# Patient Record
Sex: MALE | Race: BLACK OR AFRICAN AMERICAN | NOT HISPANIC OR LATINO | Employment: UNEMPLOYED | URBAN - METROPOLITAN AREA
[De-identification: names, ages, dates, MRNs, and addresses within clinical notes are randomized per-mention and may not be internally consistent; named-entity substitution may affect disease eponyms.]

---

## 2024-04-08 ENCOUNTER — APPOINTMENT (OUTPATIENT)
Dept: PRIMARY CARE | Facility: CLINIC | Age: 44
End: 2024-04-08
Payer: COMMERCIAL

## 2024-05-13 ENCOUNTER — APPOINTMENT (OUTPATIENT)
Dept: PRIMARY CARE | Facility: CLINIC | Age: 44
End: 2024-05-13
Payer: COMMERCIAL

## 2024-09-13 DIAGNOSIS — I47.10 SUPRAVENTRICULAR TACHYCARDIA (CMS-HCC): ICD-10-CM

## 2024-09-22 RX ORDER — METOPROLOL TARTRATE 25 MG/1
TABLET, FILM COATED ORAL
Qty: 180 TABLET | Refills: 3 | Status: SHIPPED | OUTPATIENT
Start: 2024-09-22 | End: 2024-09-27 | Stop reason: ALTCHOICE

## 2024-09-27 ENCOUNTER — ANCILLARY PROCEDURE (OUTPATIENT)
Dept: CARDIOLOGY | Facility: CLINIC | Age: 44
End: 2024-09-27
Payer: COMMERCIAL

## 2024-09-27 ENCOUNTER — OFFICE VISIT (OUTPATIENT)
Dept: CARDIOLOGY | Facility: CLINIC | Age: 44
End: 2024-09-27
Payer: COMMERCIAL

## 2024-09-27 VITALS
HEART RATE: 68 BPM | BODY MASS INDEX: 22.73 KG/M2 | RESPIRATION RATE: 20 BRPM | DIASTOLIC BLOOD PRESSURE: 99 MMHG | SYSTOLIC BLOOD PRESSURE: 167 MMHG | WEIGHT: 150 LBS | HEIGHT: 68 IN | OXYGEN SATURATION: 97 %

## 2024-09-27 DIAGNOSIS — I10 HYPERTENSION, UNSPECIFIED TYPE: Primary | ICD-10-CM

## 2024-09-27 DIAGNOSIS — F17.200 NICOTINE USE DISORDER: ICD-10-CM

## 2024-09-27 DIAGNOSIS — I51.7 LEFT VENTRICULAR HYPERTROPHY BY ELECTROCARDIOGRAM: ICD-10-CM

## 2024-09-27 DIAGNOSIS — I47.10 SVT (SUPRAVENTRICULAR TACHYCARDIA) (CMS-HCC): ICD-10-CM

## 2024-09-27 DIAGNOSIS — I10 ESSENTIAL HYPERTENSION: ICD-10-CM

## 2024-09-27 PROBLEM — R03.0 ELEVATED BLOOD PRESSURE READING WITHOUT DIAGNOSIS OF HYPERTENSION: Status: ACTIVE | Noted: 2024-09-27

## 2024-09-27 LAB
ATRIAL RATE: 68 BPM
BODY SURFACE AREA: 1.81 M2
P AXIS: 63 DEGREES
P OFFSET: 174 MS
P ONSET: 123 MS
PR INTERVAL: 200 MS
Q ONSET: 223 MS
QRS COUNT: 11 BEATS
QRS DURATION: 82 MS
QT INTERVAL: 396 MS
QTC CALCULATION(BAZETT): 421 MS
QTC FREDERICIA: 413 MS
R AXIS: 54 DEGREES
T AXIS: 38 DEGREES
T OFFSET: 421 MS
VENTRICULAR RATE: 68 BPM

## 2024-09-27 PROCEDURE — 99214 OFFICE O/P EST MOD 30 MIN: CPT | Performed by: INTERNAL MEDICINE

## 2024-09-27 PROCEDURE — 3077F SYST BP >= 140 MM HG: CPT | Performed by: INTERNAL MEDICINE

## 2024-09-27 PROCEDURE — 99214 OFFICE O/P EST MOD 30 MIN: CPT | Mod: 25 | Performed by: INTERNAL MEDICINE

## 2024-09-27 PROCEDURE — 93010 ELECTROCARDIOGRAM REPORT: CPT | Performed by: INTERNAL MEDICINE

## 2024-09-27 PROCEDURE — 93005 ELECTROCARDIOGRAM TRACING: CPT

## 2024-09-27 PROCEDURE — 3080F DIAST BP >= 90 MM HG: CPT | Performed by: INTERNAL MEDICINE

## 2024-09-27 PROCEDURE — 93246 EXT ECG>7D<15D RECORDING: CPT

## 2024-09-27 PROCEDURE — G2211 COMPLEX E/M VISIT ADD ON: HCPCS | Performed by: INTERNAL MEDICINE

## 2024-09-27 PROCEDURE — 3008F BODY MASS INDEX DOCD: CPT | Performed by: INTERNAL MEDICINE

## 2024-09-27 RX ORDER — METOPROLOL SUCCINATE 50 MG/1
50 TABLET, EXTENDED RELEASE ORAL DAILY
Qty: 30 TABLET | Refills: 11 | Status: SHIPPED | OUTPATIENT
Start: 2024-09-27 | End: 2025-09-27

## 2024-09-27 RX ORDER — DILTIAZEM HYDROCHLORIDE 120 MG/1
120 CAPSULE, EXTENDED RELEASE ORAL EVERY MORNING
COMMUNITY
End: 2024-09-27 | Stop reason: ALTCHOICE

## 2024-09-27 RX ORDER — DILTIAZEM HYDROCHLORIDE 180 MG/1
180 CAPSULE, COATED, EXTENDED RELEASE ORAL DAILY
Qty: 90 CAPSULE | Refills: 3 | Status: SHIPPED | OUTPATIENT
Start: 2024-09-27 | End: 2025-09-27

## 2024-09-27 RX ORDER — OLMESARTAN MEDOXOMIL 20 MG/1
10 TABLET ORAL DAILY
Qty: 15 TABLET | Refills: 11 | Status: SHIPPED | OUTPATIENT
Start: 2024-09-27 | End: 2025-09-27

## 2024-09-27 ASSESSMENT — PATIENT HEALTH QUESTIONNAIRE - PHQ9
2. FEELING DOWN, DEPRESSED OR HOPELESS: NOT AT ALL
1. LITTLE INTEREST OR PLEASURE IN DOING THINGS: NOT AT ALL
SUM OF ALL RESPONSES TO PHQ9 QUESTIONS 1 & 2: 0

## 2024-09-27 ASSESSMENT — PAIN SCALES - GENERAL: PAINLEVEL: 0-NO PAIN

## 2024-09-27 NOTE — PROGRESS NOTES
Referred by Dr. Hdz ref. provider found for No chief complaint on file.     HPI:    Edison Lema is a 44 y.o. 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     States that he has been in his usual state of health. Roughly 2017, he had high heart rates associated with heart palpitations and elevated blood pressure. He was referred on to electrophysiology who determined in the electrophysiologic lab that he had typical AVNRT and was ablated. He had done well with his symptoms until recently when he began to drink increased sports drinks while at work. He began to notice increased heart palpitations. States that he had an event where his blood pressure, heart rate skyrocketed following this and was taken to the hospital. He was evaluated in [facility]. He was given metoprolol tartrate to help control his and was referred on to cardiology.     States since his hospitalization, he has been using Toprol tartrate once daily. He notes that this has been helpful, but he still has significant heart palpitations and concerned about his heart.     Today /0/20/2022) reports that he is doing well. He is making some dietary changes to help improve his overall diet. He feels that this is useful. He is attempting to cut down on his smoking. He is down to roughly 1 cigarette at work, and then he will have 2 or 3 once he gets home. However, he also admits that if he has a beverage while at home, he will begin to smoke even more. He notes that his heart palpitations are dramatically improved. He continues to use the diltiazem daily, he is taking metoprolol most mornings, and occasional evenings. This is reasonable for him.     Today ( 7/31/2023) he returns for follow up. He  has bent trying to quit smoking, down to about 10 cigarettes daily. He has reduced his caffeine     9/27/2024 -- He returns for follow up, having last been seen about 14 months ago. He repors that he continues to have heart palpitations.  He is using the the as needed metoprolol tartrate daily because of his symptomatology.  He is also surprised to see his blood pressure is elevated.  He thinks that this may be related to the fact that he smoked 1 cigarette immediately prior to this appointment.  He does not routinely check his blood pressure at home.     Patient denies chest pain and angina. Pt denies shortness of breath, dyspnea on exertion, orthopnea, and paroxysmal nocturnal dyspnea. Pt denies worsening lower extremity edema. Pt reports palpitations but no  Syncope. No recent falls. No fever or chills. No cough. No change in bowel or bladder habits. No travel. No sick contacts. No recent travel     12 point review of systems was performed and is otherwise negative.  Past medical history: As above.  Medications: Reviewed.    Allergies : No Known Allergies  Family Hx : No family history on file.  Social History     Tobacco Use    Smoking status: Every Day     Current packs/day: 1.00     Types: Cigarettes    Smokeless tobacco: Never   Substance Use Topics    Alcohol use: Yes     Alcohol/week: 7.0 standard drinks of alcohol     Types: 5 Cans of beer, 2 Shots of liquor per week    Drug use: Yes     Frequency: 7.0 times per week     Types: Marijuana             Past medical history reviewed:   has no past medical history on file.    Past surgical history reviewed:   has no past surgical history on file.    Social history reviewed:   has no history on file for tobacco use, alcohol use, and drug use.     Family history reviewed:  No family history on file.    Allergies reviewed: Patient has no allergy information on record.     Medications reviewed:   Current Outpatient Medications   Medication Instructions     metoprolol tartrate (Lopressor) 25 mg tablet TAKE 1 TO 2 TABLETS BY MOUTH EVERY DAY AS NEEDED FOR AN EPISODE OF PALPITATIONS        Vitals reviewed: There were no vitals taken for this visit.    Physical Exam:   There were no vitals taken for this visit.  General:  Patient is awake, alert, and oriented.  Patient is in no acute distress.  HEENT:  Pupils equal and reactive.  Normocephalic.  Moist mucosa.    Neck:  No thyromegaly.  Normal Jugular Venous Pressure.  Cardiovascular:  Regular rate and rhythm.  Normal S1 and S2.  1/6 SILVIA.  Pulmonary:  Clear to auscultation bilaterally.  Abdomen:  Soft. Non-tender.   Non-distended.  Positive bowel sounds.  Lower Extremities:  2+ pedal pulses. No LE edema.  Neurologic:  Cranial nerves intact.  No focal deficit.   Skin: Skin warm and dry, normal skin turgor.   Psychiatric: Normal affect.    Last Labs:  CBC -      Lab Results   Component Value Date    WBC 6.5 09/16/2022    HGB 13.7 09/16/2022    HCT 41.9 09/16/2022     09/16/2022        CMP-  Lab Results   Component Value Date    GLUCOSE 117 (H) 09/16/2022     09/16/2022    K 5.1 09/16/2022     09/16/2022    CO2 30 09/16/2022    ANIONGAP 15 09/16/2022    BUN 11 09/16/2022    CREATININE 0.72 09/16/2022    CALCIUM 10.3 09/16/2022    PHOS 3.9 09/16/2022    PROT 7.6 06/25/2022    ALBUMIN 4.9 09/16/2022    AST 23 06/25/2022    ALT 21 06/25/2022    ALKPHOS 66 06/25/2022    BILITOT 0.6 06/25/2022        LIPIDS-  Lab Results   Component Value Date    CHOL 273 (H) 09/16/2022    TRIG 81 09/16/2022    HDL 77.2 09/16/2022    CHHDL 3.5 09/16/2022        OTHERS-  Lab Results   Component Value Date    BNP 33 09/16/2022        I personally reviewed the patient's recent vitals, labs, medications, orders, EKGs, pertinent cardiac imaging/ echocardiography and ischemic evaluations including stress testing/ cardiac catheterization.    Assessment and Plan:    Problem List Items Addressed This Visit       Essential hypertension     Left ventricular hypertrophy by electrocardiogram    Overview     LVEH by EKG in the setting of poorly controlled blood Pressure          Current Assessment & Plan     We will obtain a transthoracic echocardiogram for structural evaluation including ejection fraction, assessment of regional wall motion abnormalities or valvular disease, and further evaluation of hemodynamics.  -- Begin Olmesartan 10 mg ( 1/2 Tablet of 20 mg ) Daily   -- Check Labs for drug monitoring          Relevant Medications    metoprolol succinate XL (Toprol-XL) 50 mg 24 hr tablet    olmesartan (Benicar) 20 mg tablet    Other Relevant Orders    CBC and Auto Differential    Comprehensive metabolic panel    Transthoracic echo (TTE) complete    Nicotine use disorder    Overview     9/2024 -- Smoking 1 PPD          Relevant Orders    CBC and Auto Differential    Comprehensive metabolic panel    SVT (supraventricular tachycardia) (CMS-HCC)    Relevant Medications    dilTIAZem CD (Cardizem CD) 180 mg 24 hr capsule    metoprolol succinate XL (Toprol-XL) 50 mg 24 hr tablet    Other Relevant Orders    Holter Or Event Cardiac Monitor    CBC and Auto Differential    Comprehensive metabolic panel    Transthoracic echo (TTE) complete     Other Visit Diagnoses       Hypertension, unspecified type    -  Primary    Relevant Medications    olmesartan (Benicar) 20 mg tablet    Other Relevant Orders    ECG 12 lead (Clinic Performed) (Completed)    Lipid Panel    Lipoprotein a    Hemoglobin A1C    CBC and Auto Differential    Comprehensive metabolic panel    Transthoracic echo (TTE) complete              Please followup with me in Cardiology clinic within t1-2 weeks after the completion of testing.   Please return to clinic sooner or seek emergent care if your symptoms reoccur or worsen.    Thank you for allowing me to participate in their care.  Please feel free to call me with any further questions or concerns.        Boubacar Camacho DO   Division of  Cardiovascular Medicine  [facility], [facility]

## 2024-09-27 NOTE — ASSESSMENT & PLAN NOTE
We will obtain a transthoracic echocardiogram for structural evaluation including ejection fraction, assessment of regional wall motion abnormalities or valvular disease, and further evaluation of hemodynamics.  -- Begin Olmesartan 10 mg ( 1/2 Tablet of 20 mg ) Daily   -- Check Labs for drug monitoring

## 2024-09-27 NOTE — PATIENT INSTRUCTIONS
t was a pleasure seeing you today. I would like to see you back in clinic in 6-8 weeks. You can call my office if questions arise between now and our next visit.        We will adjust her medication as follows  --We will increase the diltiazem from 120 mg to 180 mg daily  --We will switch the metoprolol to tartrate short acting form to metoprolol succinate long-acting at 50 mg once daily.  Please make sure that you only take this form of metoprolol once daily  --We will also add a medication called olmesartan 20 mg taking 1/2 tablet daily to start.  I anticipate that we will actually need to increase to the full 20 mg at her next appointment  -- Please try and check your blood pressure 3 times daily.  Check it first thing in the morning, roughly 2 to 3 hours after you have taken your blood pressure medications, and again in the evening.  This will give us a better understanding as to how well your blood pressure is controlled on the current medications.      We will perform an event monitor to understand how much palpitation burden you are actually having.    We will also check an echocardiogram to relook at the structure and function of your heart since her EKG is starting to show increased changes of the thickened heart which we can see and longstanding blood pressure issues    Please stop smoking.  Please stop smoking.    --Try to cut back on the number of cigarettes that you smoke.    -- Try to increase the interval between cigarettes.   -- Try to use substitutes such as sugar-free candy carrots,celery sticks as in between.  --  Once you are down to less than half a pack a day try to go cold turkey.   -- Try to designate areas in the your home as smoke-free areas.   -- Try to reduce the number of ashtrays and other reminders of smoking.   -- Try to keep any major something that you dislike next to your cigarette pack to try to develop a mental aversion to smoking          Below are some heart healthy tips that  "we provide all of our patients. I hope you find it useful.     -Please try to cut back on the number of cigarettes. He smoked. Try to increase the interval between cigarettes and try to use substitutes such as sugar-free candy carrots,celery sticks as in between.  Once again down to less than half a pack a day try to go cold turkey.   try to designate areas in the your home as smoke-free areas. Try to reduce the number of ashtrays and other reminders of smoking. Try to keep any major something that you dislike next to your cigarette pack to try to develop a mental aversion to smoking        We also recommend following a \"whole Food = Plant Based Diet\" Try and switch to 5 meals / weekk that are primarily vegetable based. We recommend the website \"[employer]ks[employer]\"      - We recommend you follow a heart healthy diet. Watch food labels and try not to eat more than 2,500 mg of sodium per day. Avoid foods high in salt like processed meats (lunch meats, presley, and sausage), processed foods (boxed dinners, canned soups), fried and fast foods. Monitor serving sizes and if the sodium per serving size is more than 200 mg, avoid those foods. If the sodium per serving size is between 100-200 mg, you can use those in limited quantities. Try to choose foods where the amount of sodium per serving size is less than 100 mg. Try to eat a diet rich in fruits and vegetables, whole grains, low fat dairy products, skinless poultry and fish, nuts, beans, non-tropical vegetable oils. Limit saturated fat, trans fat, sodium, red meats, and sugar-sweetened beverages.   Limit alcohol      -The combination of a reduced-calorie diet and increased physical activity is recommended. Adults should aim to get at least 150 minutes of moderate physical activity per week (30 minutes of moderate physical activities at least 5 days per week). Examples of moderate physical activities include brisk walking, swimming, aerobic dancing, heavy gardening, " "jumping rope, bicycling 10 MPH or faster, tennis, hiking uphill or with a heavy backpack. Please let us know if you would like to learn more about your nutrition and calories and additional options including weight loss programs to help you reach your goal.      -If you smoke, stop smoking. iIf you stop smoking you can help get rid of a major source of stress to your heart. Smoking makes your heart rate and blood pressure go up and increases your risk or developing cardiovascular diseases and worsen symptoms associated with heart failure.      -Obtain a BP monitor and monitor your BP daily. Check it around the same time each day; at least 1 hour after taking your medications. Record your BP in a log and bring your log with you to your doctors appointment.      -F/u with your PCP as recommended.      - If you are having problems with medications, consider using \"GoodRx\" to help locate a more cost effective measure to obtai medications. We are happy to supply written prescriptions if needed to allow you to obtain your medications from different pharmacies. Additionally, if you are having issues with mail order delivery, please let us know. We can send a limited supply of your medications to your local pharmacy.   "

## 2024-10-09 LAB
ATRIAL RATE: 68 BPM
P AXIS: 63 DEGREES
P OFFSET: 174 MS
P ONSET: 123 MS
PR INTERVAL: 200 MS
Q ONSET: 223 MS
QRS COUNT: 11 BEATS
QRS DURATION: 82 MS
QT INTERVAL: 396 MS
QTC CALCULATION(BAZETT): 421 MS
QTC FREDERICIA: 413 MS
R AXIS: 54 DEGREES
T AXIS: 38 DEGREES
T OFFSET: 421 MS
VENTRICULAR RATE: 68 BPM

## 2024-10-11 ENCOUNTER — APPOINTMENT (OUTPATIENT)
Dept: CARDIOLOGY | Facility: CLINIC | Age: 44
End: 2024-10-11
Payer: COMMERCIAL

## 2024-10-21 DIAGNOSIS — I47.10 SUPRAVENTRICULAR TACHYCARDIA (CMS-HCC): ICD-10-CM

## 2024-10-21 RX ORDER — DILTIAZEM HYDROCHLORIDE 120 MG/1
120 CAPSULE, EXTENDED RELEASE ORAL EVERY MORNING
Qty: 90 CAPSULE | Refills: 3 | OUTPATIENT
Start: 2024-10-21

## 2024-10-22 ENCOUNTER — APPOINTMENT (OUTPATIENT)
Dept: CARDIOLOGY | Facility: CLINIC | Age: 44
End: 2024-10-22
Payer: COMMERCIAL

## 2024-10-25 ENCOUNTER — ANCILLARY PROCEDURE (OUTPATIENT)
Dept: CARDIOLOGY | Facility: CLINIC | Age: 44
End: 2024-10-25
Payer: COMMERCIAL

## 2024-10-25 ENCOUNTER — APPOINTMENT (OUTPATIENT)
Dept: RADIOLOGY | Facility: HOSPITAL | Age: 44
End: 2024-10-25
Payer: COMMERCIAL

## 2024-10-25 ENCOUNTER — HOSPITAL ENCOUNTER (EMERGENCY)
Facility: HOSPITAL | Age: 44
Discharge: HOME | End: 2024-10-26
Attending: EMERGENCY MEDICINE
Payer: COMMERCIAL

## 2024-10-25 DIAGNOSIS — I10 HYPERTENSION, UNSPECIFIED TYPE: ICD-10-CM

## 2024-10-25 DIAGNOSIS — I47.10 SVT (SUPRAVENTRICULAR TACHYCARDIA) (CMS-HCC): ICD-10-CM

## 2024-10-25 DIAGNOSIS — R41.3 AMNESIA: Primary | ICD-10-CM

## 2024-10-25 DIAGNOSIS — I51.7 LEFT VENTRICULAR HYPERTROPHY BY ELECTROCARDIOGRAM: ICD-10-CM

## 2024-10-25 LAB
AORTIC VALVE PEAK VELOCITY: 1.61 M/S
AV PEAK GRADIENT: 10.3 MMHG
AVA (PEAK VEL): 2.89 CM2
BASOPHILS # BLD AUTO: 0.04 X10*3/UL (ref 0–0.1)
BASOPHILS NFR BLD AUTO: 0.5 %
EJECTION FRACTION APICAL 4 CHAMBER: 68.6
EJECTION FRACTION: 68 %
EOSINOPHIL # BLD AUTO: 0.01 X10*3/UL (ref 0–0.7)
EOSINOPHIL NFR BLD AUTO: 0.1 %
ERYTHROCYTE [DISTWIDTH] IN BLOOD BY AUTOMATED COUNT: 12.2 % (ref 11.5–14.5)
HCT VFR BLD AUTO: 36.1 % (ref 41–52)
HGB BLD-MCNC: 11.7 G/DL (ref 13.5–17.5)
IMM GRANULOCYTES # BLD AUTO: 0.02 X10*3/UL (ref 0–0.7)
IMM GRANULOCYTES NFR BLD AUTO: 0.2 % (ref 0–0.9)
LEFT ATRIUM VOLUME AREA LENGTH INDEX BSA: 29.5 ML/M2
LEFT VENTRICLE INTERNAL DIMENSION DIASTOLE: 5.12 CM (ref 3.5–6)
LEFT VENTRICULAR OUTFLOW TRACT DIAMETER: 2.06 CM
LYMPHOCYTES # BLD AUTO: 1.52 X10*3/UL (ref 1.2–4.8)
LYMPHOCYTES NFR BLD AUTO: 18.8 %
MCH RBC QN AUTO: 34.5 PG (ref 26–34)
MCHC RBC AUTO-ENTMCNC: 32.4 G/DL (ref 32–36)
MCV RBC AUTO: 107 FL (ref 80–100)
MITRAL VALVE E/A RATIO: 1.34
MONOCYTES # BLD AUTO: 0.74 X10*3/UL (ref 0.1–1)
MONOCYTES NFR BLD AUTO: 9.1 %
NEUTROPHILS # BLD AUTO: 5.76 X10*3/UL (ref 1.2–7.7)
NEUTROPHILS NFR BLD AUTO: 71.3 %
NRBC BLD-RTO: 0 /100 WBCS (ref 0–0)
PLATELET # BLD AUTO: 336 X10*3/UL (ref 150–450)
RBC # BLD AUTO: 3.39 X10*6/UL (ref 4.5–5.9)
RIGHT VENTRICLE FREE WALL PEAK S': 18 CM/S
RIGHT VENTRICLE PEAK SYSTOLIC PRESSURE: 27.8 MMHG
TRICUSPID ANNULAR PLANE SYSTOLIC EXCURSION: 2.6 CM
WBC # BLD AUTO: 8.1 X10*3/UL (ref 4.4–11.3)

## 2024-10-25 PROCEDURE — 93306 TTE W/DOPPLER COMPLETE: CPT

## 2024-10-25 PROCEDURE — 93306 TTE W/DOPPLER COMPLETE: CPT | Performed by: INTERNAL MEDICINE

## 2024-10-25 PROCEDURE — 70450 CT HEAD/BRAIN W/O DYE: CPT

## 2024-10-25 PROCEDURE — 85025 COMPLETE CBC W/AUTO DIFF WBC: CPT | Performed by: EMERGENCY MEDICINE

## 2024-10-25 PROCEDURE — 36415 COLL VENOUS BLD VENIPUNCTURE: CPT | Performed by: EMERGENCY MEDICINE

## 2024-10-25 PROCEDURE — 80053 COMPREHEN METABOLIC PANEL: CPT | Performed by: EMERGENCY MEDICINE

## 2024-10-25 PROCEDURE — 99284 EMERGENCY DEPT VISIT MOD MDM: CPT | Mod: 25

## 2024-10-25 PROCEDURE — 83735 ASSAY OF MAGNESIUM: CPT | Performed by: EMERGENCY MEDICINE

## 2024-10-25 ASSESSMENT — COLUMBIA-SUICIDE SEVERITY RATING SCALE - C-SSRS
6. HAVE YOU EVER DONE ANYTHING, STARTED TO DO ANYTHING, OR PREPARED TO DO ANYTHING TO END YOUR LIFE?: NO
1. IN THE PAST MONTH, HAVE YOU WISHED YOU WERE DEAD OR WISHED YOU COULD GO TO SLEEP AND NOT WAKE UP?: NO
2. HAVE YOU ACTUALLY HAD ANY THOUGHTS OF KILLING YOURSELF?: NO

## 2024-10-25 ASSESSMENT — LIFESTYLE VARIABLES
TOTAL SCORE: 0
EVER HAD A DRINK FIRST THING IN THE MORNING TO STEADY YOUR NERVES TO GET RID OF A HANGOVER: NO
EVER FELT BAD OR GUILTY ABOUT YOUR DRINKING: NO
HAVE PEOPLE ANNOYED YOU BY CRITICIZING YOUR DRINKING: NO
HAVE YOU EVER FELT YOU SHOULD CUT DOWN ON YOUR DRINKING: NO

## 2024-10-25 NOTE — ED PROVIDER NOTES
"Patient presented the emergency department EMS and I was called to evaluate the patient due to concern for stroke.  Patient states that he had a transient episode of memory loss.  He states \"it felt like something in my brain splint\".  He states that this was short-lived and now he feels back to normal.  He denied any weakness.  He denied any trouble speaking or swallowing.  He is unsure if he had a fight with his wife.  He states that he felt like he is \"blocking things out of his memory\".  On arrival his NIH is 0.  Stroke team was not activated.     Ian Green MD  10/25/24 [id number]    "

## 2024-10-25 NOTE — ED TRIAGE NOTES
Pt states getting home from work and going to sit on the couch, states he sat down and then sat up and could not remember anything, states he feels off, states he felt like he  and returned to his body

## 2024-10-26 VITALS
DIASTOLIC BLOOD PRESSURE: 58 MMHG | TEMPERATURE: 98.4 F | HEART RATE: 60 BPM | BODY MASS INDEX: 23.34 KG/M2 | RESPIRATION RATE: 16 BRPM | SYSTOLIC BLOOD PRESSURE: 106 MMHG | WEIGHT: 154 LBS | HEIGHT: 68 IN | OXYGEN SATURATION: 100 %

## 2024-10-26 LAB
ALBUMIN SERPL BCP-MCNC: 4.4 G/DL (ref 3.4–5)
ALP SERPL-CCNC: 75 U/L (ref 33–120)
ALT SERPL W P-5'-P-CCNC: 11 U/L (ref 10–52)
ANION GAP SERPL CALC-SCNC: 16 MMOL/L (ref 10–20)
AST SERPL W P-5'-P-CCNC: 17 U/L (ref 9–39)
BILIRUB SERPL-MCNC: 0.7 MG/DL (ref 0–1.2)
BUN SERPL-MCNC: 9 MG/DL (ref 6–23)
CALCIUM SERPL-MCNC: 9.8 MG/DL (ref 8.6–10.3)
CHLORIDE SERPL-SCNC: 100 MMOL/L (ref 98–107)
CO2 SERPL-SCNC: 27 MMOL/L (ref 21–32)
CREAT SERPL-MCNC: 0.61 MG/DL (ref 0.5–1.3)
EGFRCR SERPLBLD CKD-EPI 2021: >90 ML/MIN/1.73M*2
GLUCOSE SERPL-MCNC: 100 MG/DL (ref 74–99)
MAGNESIUM SERPL-MCNC: 2.02 MG/DL (ref 1.6–2.4)
POTASSIUM SERPL-SCNC: 4 MMOL/L (ref 3.5–5.3)
PROT SERPL-MCNC: 7.9 G/DL (ref 6.4–8.2)
SODIUM SERPL-SCNC: 139 MMOL/L (ref 136–145)

## 2024-10-26 PROCEDURE — 70450 CT HEAD/BRAIN W/O DYE: CPT | Performed by: RADIOLOGY

## 2024-10-26 NOTE — DISCHARGE INSTRUCTIONS
As discussed should you begin experiencing word finding difficulties slurred speech changes in vision weakness numbness tingling symptoms concerning to call 911 or return to the nearest emergency department immediately.  Otherwise follow-up with your primary physician as well as the neurologist as provided.  Please try to keep your stress levels to a minimum.

## 2024-10-26 NOTE — PROGRESS NOTES
This is a 44-year-old male that was signed out to me to follow-up on CT imaging of the head.  Patient arrived with amnesia like symptoms.  His NIH was 0 upon arrival.  There is a very low concern for CVA.  Plan at time of signout is to have patient discharged if CT imaging is benign.  He is to have close follow-up.  I did follow-up with the CT imaging.  Show signs of chronic sinusitis incidental findings were thoroughly discussed with the patient recommended close outpatient follow-up.  The remainder of his lab work is grossly benign no significant electrolyte derangements no leukocytosis making factious etiology less likely.  He does have a mild anemia although no active signs of bleeding remains hemodynamically stable.  Patient is requesting to be discharged home feel that this is reasonable.  He is provided appropriate follow-up with his primary physician as well as neurology.  He is appreciative of care agreeable with plan.  Unclear etiology of his amnesia although he is alert and oriented to person time and place at this time mentating appropriately has no focal deficits.  This would be highly unusual for CVA.  He has no meningismal signs either.  He is appreciative of care agreeable with plan discharged in stable condition.    VS: As documented in the triage note from today's date and EMR flowsheet were reviewed.  Gen: Well developed. No acute distress. Seated in bed. Appears nontoxic.  Alert and oriented to person and place.  Skin: Warm. Dry. Intact. No rashes or lesions.  Eyes: Pupils equally round and reactive to light. Clear sclera. EOMI.  HENT: Atraumatic appearance. Mucosal membranes moist. No oral lesions, uvula midline, airway patent.  TMs clear bilaterally nares clear bilaterally.  No meningismal signs.  Trachea is midline.  CV: Regular rate and regular rhythm. S1, S2. No pedal edema. Warm extremities.  Resp: Nonlabored breathing Clear to auscultation bilaterally. No increased work of breathing.   GI:  Soft and nontender. No rebound or guarding. Bowel sounds x4 present.   MSK: Symmetric muscle bulk. No joint swelling in the extremities. Compartments are soft. Neurovascularly intact x4 extremities. Radial pulses +2 equal bilaterally.  Pedal pulses +2 equal bilateral.  Neuro: Alert. CN II - XII intact. Speech fluent. Moving all extremities. No focal deficits. Gait normal.  NIH 0.  Van negative.  Psych: Appropriate. Kempt.    ED Course as of 10/26/24 0614   Fri Oct 25, 2024   2346 CBC demonstrates mild anemia. [MK]      ED Course User Index  [MK] Ian Green MD         Diagnoses as of 10/26/24 0614   Amnesia       Ian Hdez DO

## 2024-10-26 NOTE — PROGRESS NOTES
Transition of care note: Patient is a 44-year-old male, medical history significant for hypertension, presents to the emergency department due to concern for memory changes and memory loss.  On arrival, patient was examined.  He has an NIH of 0.  He was under a lot of stress today.  He has no headache.  He is not meningitic or encephalopathic.  He denies any visual change.    Physical exam:  Well-appearing man no acute distress  Head normocephalic atraumatic  Neck supple without lymphadenopathy  Heart regular rate rhythm  Lungs clear without wheezes or rhonchi  Abdomen soft and nontender, normal bowel sounds  Extremities show no edema  Neuroexam displays no focal or lateralizing deficit  Skin dry and warm      MDM: Patient symptoms have totally resolved.  He underwent metabolic workup.  Labs are essentially unremarkable.  Noncontrast head CT negative.  Patient be reevaluated by oncoming physician who will make final disposition.  ED Course as of 10/28/24 1038   Fri Oct 25, 2024   2346 CBC demonstrates mild anemia. [MK]      ED Course User Index  [MK] Ian Green MD         Diagnoses as of 10/28/24 1038   Amnesia      Vitals:    10/25/24 1933   BP: 147/79   Pulse: 75   Resp: 18   Temp: 36.7 °C (98.1 °F)   SpO2: 99%     Results for orders placed or performed in visit on 10/25/24 (from the past 24 hours)   Transthoracic echo (TTE) complete   Result Value Ref Range    AV pk miki 1.61 m/s    LVOT diam 2.06 cm    MV E/A ratio 1.34     LA vol index A/L 29.5 ml/m2    Tricuspid annular plane systolic excursion 2.6 cm    LV EF 68 %    RV free wall pk S' 18.00 cm/s    LVIDd 5.12 cm    RVSP 27.8 mmHg    Aortic Valve Area by Continuity of Peak Velocity 2.89 cm2    AV pk grad 10.3 mmHg    LV A4C EF 68.6       Procedures

## 2024-11-01 LAB — BODY SURFACE AREA: 1.81 M2

## 2024-11-11 ENCOUNTER — APPOINTMENT (OUTPATIENT)
Dept: CARDIOLOGY | Facility: CLINIC | Age: 44
End: 2024-11-11
Payer: COMMERCIAL

## 2025-04-29 ENCOUNTER — HOSPITAL ENCOUNTER (EMERGENCY)
Facility: HOSPITAL | Age: 45
Discharge: HOME | End: 2025-04-29
Payer: COMMERCIAL

## 2025-04-29 ENCOUNTER — APPOINTMENT (OUTPATIENT)
Dept: RADIOLOGY | Facility: HOSPITAL | Age: 45
End: 2025-04-29
Payer: COMMERCIAL

## 2025-04-29 VITALS
HEIGHT: 68 IN | BODY MASS INDEX: 22.73 KG/M2 | SYSTOLIC BLOOD PRESSURE: 173 MMHG | WEIGHT: 150 LBS | TEMPERATURE: 98.2 F | HEART RATE: 80 BPM | OXYGEN SATURATION: 97 % | RESPIRATION RATE: 16 BRPM | DIASTOLIC BLOOD PRESSURE: 81 MMHG

## 2025-04-29 DIAGNOSIS — N45.1 LEFT EPIDIDYMITIS: Primary | ICD-10-CM

## 2025-04-29 LAB
ALBUMIN SERPL BCP-MCNC: 4.7 G/DL (ref 3.4–5)
ALP SERPL-CCNC: 77 U/L (ref 33–120)
ALT SERPL W P-5'-P-CCNC: 13 U/L (ref 10–52)
ANION GAP SERPL CALC-SCNC: 15 MMOL/L (ref 10–20)
APPEARANCE UR: CLEAR
AST SERPL W P-5'-P-CCNC: 23 U/L (ref 9–39)
BASOPHILS # BLD AUTO: 0.04 X10*3/UL (ref 0–0.1)
BASOPHILS NFR BLD AUTO: 0.6 %
BILIRUB SERPL-MCNC: 0.6 MG/DL (ref 0–1.2)
BILIRUB UR STRIP.AUTO-MCNC: NEGATIVE MG/DL
BUN SERPL-MCNC: 10 MG/DL (ref 6–23)
CALCIUM SERPL-MCNC: 9.8 MG/DL (ref 8.6–10.3)
CHLORIDE SERPL-SCNC: 99 MMOL/L (ref 98–107)
CO2 SERPL-SCNC: 25 MMOL/L (ref 21–32)
COLOR UR: YELLOW
CREAT SERPL-MCNC: 0.58 MG/DL (ref 0.5–1.3)
EGFRCR SERPLBLD CKD-EPI 2021: >90 ML/MIN/1.73M*2
EOSINOPHIL # BLD AUTO: 0.06 X10*3/UL (ref 0–0.7)
EOSINOPHIL NFR BLD AUTO: 1 %
ERYTHROCYTE [DISTWIDTH] IN BLOOD BY AUTOMATED COUNT: 11.9 % (ref 11.5–14.5)
GLUCOSE SERPL-MCNC: 94 MG/DL (ref 74–99)
GLUCOSE UR STRIP.AUTO-MCNC: NORMAL MG/DL
HCT VFR BLD AUTO: 36.3 % (ref 41–52)
HGB BLD-MCNC: 11.8 G/DL (ref 13.5–17.5)
IMM GRANULOCYTES # BLD AUTO: 0.03 X10*3/UL (ref 0–0.7)
IMM GRANULOCYTES NFR BLD AUTO: 0.5 % (ref 0–0.9)
KETONES UR STRIP.AUTO-MCNC: ABNORMAL MG/DL
LEUKOCYTE ESTERASE UR QL STRIP.AUTO: NEGATIVE
LYMPHOCYTES # BLD AUTO: 1.31 X10*3/UL (ref 1.2–4.8)
LYMPHOCYTES NFR BLD AUTO: 20.9 %
MCH RBC QN AUTO: 34.2 PG (ref 26–34)
MCHC RBC AUTO-ENTMCNC: 32.5 G/DL (ref 32–36)
MCV RBC AUTO: 105 FL (ref 80–100)
MONOCYTES # BLD AUTO: 0.8 X10*3/UL (ref 0.1–1)
MONOCYTES NFR BLD AUTO: 12.8 %
MUCOUS THREADS #/AREA URNS AUTO: NORMAL /LPF
NEUTROPHILS # BLD AUTO: 4.02 X10*3/UL (ref 1.2–7.7)
NEUTROPHILS NFR BLD AUTO: 64.2 %
NITRITE UR QL STRIP.AUTO: NEGATIVE
NRBC BLD-RTO: 0 /100 WBCS (ref 0–0)
PH UR STRIP.AUTO: 6.5 [PH]
PLATELET # BLD AUTO: 244 X10*3/UL (ref 150–450)
POTASSIUM SERPL-SCNC: 4.2 MMOL/L (ref 3.5–5.3)
PROT SERPL-MCNC: 7.8 G/DL (ref 6.4–8.2)
PROT UR STRIP.AUTO-MCNC: ABNORMAL MG/DL
RBC # BLD AUTO: 3.45 X10*6/UL (ref 4.5–5.9)
RBC # UR STRIP.AUTO: NEGATIVE MG/DL
RBC #/AREA URNS AUTO: NORMAL /HPF
SODIUM SERPL-SCNC: 135 MMOL/L (ref 136–145)
SP GR UR STRIP.AUTO: 1.03
UROBILINOGEN UR STRIP.AUTO-MCNC: NORMAL MG/DL
WBC # BLD AUTO: 6.3 X10*3/UL (ref 4.4–11.3)
WBC #/AREA URNS AUTO: NORMAL /HPF

## 2025-04-29 PROCEDURE — 81001 URINALYSIS AUTO W/SCOPE: CPT | Performed by: NURSE PRACTITIONER

## 2025-04-29 PROCEDURE — 36415 COLL VENOUS BLD VENIPUNCTURE: CPT | Performed by: NURSE PRACTITIONER

## 2025-04-29 PROCEDURE — 99284 EMERGENCY DEPT VISIT MOD MDM: CPT | Mod: 25

## 2025-04-29 PROCEDURE — 80053 COMPREHEN METABOLIC PANEL: CPT | Performed by: NURSE PRACTITIONER

## 2025-04-29 PROCEDURE — 96374 THER/PROPH/DIAG INJ IV PUSH: CPT

## 2025-04-29 PROCEDURE — 93976 VASCULAR STUDY: CPT | Mod: FOREIGN READ | Performed by: RADIOLOGY

## 2025-04-29 PROCEDURE — 2500000004 HC RX 250 GENERAL PHARMACY W/ HCPCS (ALT 636 FOR OP/ED): Performed by: NURSE PRACTITIONER

## 2025-04-29 PROCEDURE — 93975 VASCULAR STUDY: CPT

## 2025-04-29 PROCEDURE — 76870 US EXAM SCROTUM: CPT | Mod: FOREIGN READ | Performed by: RADIOLOGY

## 2025-04-29 PROCEDURE — 85025 COMPLETE CBC W/AUTO DIFF WBC: CPT | Performed by: NURSE PRACTITIONER

## 2025-04-29 PROCEDURE — 2500000001 HC RX 250 WO HCPCS SELF ADMINISTERED DRUGS (ALT 637 FOR MEDICARE OP): Performed by: NURSE PRACTITIONER

## 2025-04-29 RX ORDER — KETOROLAC TROMETHAMINE 30 MG/ML
15 INJECTION, SOLUTION INTRAMUSCULAR; INTRAVENOUS ONCE
Status: COMPLETED | OUTPATIENT
Start: 2025-04-29 | End: 2025-04-29

## 2025-04-29 RX ORDER — NAPROXEN 500 MG/1
500 TABLET ORAL
Qty: 14 TABLET | Refills: 0 | Status: SHIPPED | OUTPATIENT
Start: 2025-04-29 | End: 2025-05-06

## 2025-04-29 RX ORDER — LEVOFLOXACIN 500 MG/1
500 TABLET, FILM COATED ORAL DAILY
Qty: 10 TABLET | Refills: 0 | Status: SHIPPED | OUTPATIENT
Start: 2025-04-29 | End: 2025-05-09

## 2025-04-29 RX ORDER — LEVOFLOXACIN 500 MG/1
500 TABLET, FILM COATED ORAL ONCE
Status: COMPLETED | OUTPATIENT
Start: 2025-04-29 | End: 2025-04-29

## 2025-04-29 RX ADMIN — LEVOFLOXACIN 500 MG: 500 TABLET, FILM COATED ORAL at 19:52

## 2025-04-29 RX ADMIN — KETOROLAC TROMETHAMINE 15 MG: 30 INJECTION, SOLUTION INTRAMUSCULAR at 17:47

## 2025-04-29 ASSESSMENT — PAIN - FUNCTIONAL ASSESSMENT
PAIN_FUNCTIONAL_ASSESSMENT: 0-10
PAIN_FUNCTIONAL_ASSESSMENT: 0-10

## 2025-04-29 ASSESSMENT — PAIN DESCRIPTION - LOCATION
LOCATION: GROIN
LOCATION: SCROTUM

## 2025-04-29 ASSESSMENT — COLUMBIA-SUICIDE SEVERITY RATING SCALE - C-SSRS
6. HAVE YOU EVER DONE ANYTHING, STARTED TO DO ANYTHING, OR PREPARED TO DO ANYTHING TO END YOUR LIFE?: NO
2. HAVE YOU ACTUALLY HAD ANY THOUGHTS OF KILLING YOURSELF?: NO
1. IN THE PAST MONTH, HAVE YOU WISHED YOU WERE DEAD OR WISHED YOU COULD GO TO SLEEP AND NOT WAKE UP?: NO

## 2025-04-29 ASSESSMENT — PAIN DESCRIPTION - DESCRIPTORS: DESCRIPTORS: SHARP

## 2025-04-29 ASSESSMENT — LIFESTYLE VARIABLES: HAVE YOU EVER FELT YOU SHOULD CUT DOWN ON YOUR DRINKING: NO

## 2025-04-29 ASSESSMENT — PAIN SCALES - GENERAL
PAINLEVEL_OUTOF10: 8
PAINLEVEL_OUTOF10: 9

## 2025-04-29 ASSESSMENT — PAIN DESCRIPTION - ORIENTATION: ORIENTATION: LEFT

## 2025-04-29 ASSESSMENT — PAIN DESCRIPTION - PAIN TYPE: TYPE: ACUTE PAIN

## 2025-04-29 NOTE — ED PROVIDER NOTES
"Limitations to History: None     HPI:      Edison Lema is a 44 y.o. with history of SVT s/p ablation and HTN presenting to ED today from home with wife for evaluation of testicle pain.  For the last month the patient has felt as though his left testicle is \"stuck up inside.\"  Recently seen by urology and ultrasound scheduled for May 4.  The increasing pain was too bad so he came to the ER for further evaluation.  No traumatic injury.  Also endorsing intermittent low back pain.  Motrin last taken at 1:30 PM provided minimal relief of symptoms.  Pain currently rated 7/10.  Worse with sitting.  , denies being sexually active.  No penile drainage.  Denies fever/chills, cough/cold symptoms, chest pain, shortness of breath, nausea/vomiting, abdominal pain, urinary symptoms, change in bowel habits or any other complaints.  No smoking, EtOH or IV drugs.  Requesting new urologist    Additional History Obtained from:     ------------------------------------------------------------------------------------------------------------------------------------------    VS: As documented in the triage note and EMR flowsheet from this visit were reviewed.    Physical Exam:  Gen:  44-year-old male, awake and alert, oriented x 3.  Well-nourished and hydrated.  Appears uncomfortable but nontoxic looking.  Head/Neck: NCAT, neck w/ FROM  Eyes: EOMI, PERRL, anicteric sclerae, noninjected conjunctivae  Ears: TMs clear b/l without sign of infection  Nose: Nares patent w/o rhinorrhea  Mouth:  MMM, no OP lesions noted  Heart: RRR no MRG  Lungs: CTA b/l no RRW, no increased work of breathing  Abdomen: Slightly rounded and soft, bowel sounds present, nontender.  No CVA tenderness.  : None  exam performed with chaperone.  Left testicle tender to the touch, mild swelling.  Testicle nontender, within normal limits.  No lesions or wounds.  No Vijay's.  Musculoskeletal: JOSEFA x 4.  MSPs intact.  Skin intact.  No deformities  Neurologic: " Alert, symmetrical facies, phonates clearly, moves all extremities equally, responsive to touch, ambulates normally   Skin: Pink, warm and dry.  No erythema, edema or ecchymosis.  No rashes noted  Psychological: calm, no SI/HI      ------------------------------------------------------------------------------------------------------------------------------------------    Medical Decision Makin y.o. with history of SVT s/p ablation and HTN is evaluated at the bedside for nontraumatic left testicle pain x 1 month.  Vital signs within normal limits.  Afebrile.  Lungs clear, abdomen soft nontender.  Left testicle is tender and slightly swollen, no erythema.  No evidence of lesions in the private area or Vijay's.    Differential includes is not limited to epididymitis, UTI and testicular hernia.    IV established, will check basic labs, UA/urine culture and perform ultrasound of the scrotum with Dopplers.  IV Toradol for pain.    ED Course as of 25 Laboratory studies were reviewed, no leukocytosis or evidence of anemia.  Normal kidney function, electrolytes and LFTs.  Ultrasound of the scrotum shows findings consistent with left sided epididymitis.  Low suspicion for STI, , 1 partner, not sexually active.  Will treat with Levaquin p.o. 500 mg.  Awaiting UA.  Taking oral fluids without difficulty [SB]   193 Urine does not show any evidence of infection.  Repeat vital signs within normal limits.  Final diagnosis today is left-sided epididymitis.  Treated with Levaquin daily x 10 days.  Additional treatment includes naproxen twice a day for anti-inflammatory.  Rest.  Follow-up with urology in the next 2 to 3 days.  Advised to call tomorrow to set up follow-up appointment.  Advised to stay well-hydrated.  Off work today through Thursday, may return Friday if needed.  Return precautions red flags discussed.  All questions were entertained and answered.  Shared decision  making conducted.  Patient verbalizes understanding of diagnosis and treatment plan.  Condition stable for discharge. [SB]      ED Course User Index  [SB] OTONIEL Gonzales         Diagnoses as of 04/29/25 2001   Left epididymitis       EKG interpreted by myself (ED attending physician): Not ordered    Chronic Medical Conditions Significantly Affecting Care: None    External Records Reviewed: I reviewed recent and relevant outside records including: None    Discussion of Management with Other Providers: None    I discussed the patient/results with: None     OTONIEL Gonzales  04/29/25 2002

## 2025-04-29 NOTE — DISCHARGE INSTRUCTIONS
You have epididymitis of the left testicle, treatment is symptomatic with antibiotics daily x 10 days, first dose given in the emergency department.  Remainder sent to pharmacy.  Naproxen twice a day for anti-inflammatory.  Rest.  Resume any normal medications.  Stay well-hydrated.  Follow-up with urology in the next 2 to 3 days, call tomorrow to set up follow-up appointment.  Off work through Thursday, may return Friday .

## 2025-04-29 NOTE — Clinical Note
Edison Lema was seen and treated in our emergency department on 4/29/2025.  He may return to work on 05/02/2025.       If you have any questions or concerns, please don't hesitate to call.      Reina Salcedo, APRN-CNP

## 2025-04-29 NOTE — ED TRIAGE NOTES
PT. C/O PAIN TO LEFT TESTICLE. PT. STATES HAS BEEN HAVING DISCOMFORT FOR PAST 2-2.5 WEEKS, STATES FEELS LIKE TESTICLE PULLED UP/TENSE. PT. STATES PAIN RADIATING INTO LEFT LEG. DENIES DIFFICULTY URINATING OR PENILE DISCHARGE.

## 2025-04-30 LAB — HOLD SPECIMEN: NORMAL
